# Patient Record
Sex: MALE | Race: WHITE | Employment: STUDENT | URBAN - METROPOLITAN AREA
[De-identification: names, ages, dates, MRNs, and addresses within clinical notes are randomized per-mention and may not be internally consistent; named-entity substitution may affect disease eponyms.]

---

## 2022-11-27 ENCOUNTER — OFFICE VISIT (OUTPATIENT)
Dept: FAMILY MEDICINE CLINIC | Facility: CLINIC | Age: 22
End: 2022-11-27
Payer: COMMERCIAL

## 2022-11-27 VITALS
HEART RATE: 81 BPM | DIASTOLIC BLOOD PRESSURE: 70 MMHG | WEIGHT: 150 LBS | OXYGEN SATURATION: 99 % | TEMPERATURE: 102 F | BODY MASS INDEX: 21.24 KG/M2 | RESPIRATION RATE: 18 BRPM | SYSTOLIC BLOOD PRESSURE: 104 MMHG | HEIGHT: 70.5 IN

## 2022-11-27 DIAGNOSIS — R68.89 FLU-LIKE SYMPTOMS: Primary | ICD-10-CM

## 2022-11-27 DIAGNOSIS — R50.9 FEVER IN ADULT: ICD-10-CM

## 2022-11-27 LAB
CONTROL LINE PRESENT WITH A CLEAR BACKGROUND (YES/NO): YES YES/NO
STREP GRP A CUL-SCR: NEGATIVE

## 2022-11-27 PROCEDURE — 3078F DIAST BP <80 MM HG: CPT | Performed by: NURSE PRACTITIONER

## 2022-11-27 PROCEDURE — 87637 SARSCOV2&INF A&B&RSV AMP PRB: CPT | Performed by: NURSE PRACTITIONER

## 2022-11-27 PROCEDURE — 3074F SYST BP LT 130 MM HG: CPT | Performed by: NURSE PRACTITIONER

## 2022-11-27 PROCEDURE — 99202 OFFICE O/P NEW SF 15 MIN: CPT | Performed by: NURSE PRACTITIONER

## 2022-11-27 PROCEDURE — 3008F BODY MASS INDEX DOCD: CPT | Performed by: NURSE PRACTITIONER

## 2022-11-27 PROCEDURE — 87880 STREP A ASSAY W/OPTIC: CPT | Performed by: NURSE PRACTITIONER

## 2022-11-27 RX ORDER — BALOXAVIR MARBOXIL 40 MG/1
40 TABLET, FILM COATED ORAL ONCE
Qty: 1 EACH | Refills: 0 | Status: SHIPPED | OUTPATIENT
Start: 2022-11-27 | End: 2022-11-27

## 2022-11-27 NOTE — PATIENT INSTRUCTIONS
Nadeen Siddiqui as prescribed  -Covid/RSV/FLU sent to lab  -Follow CDC guidelines  -Push fluids and plenty of rest    -OTC cough medicine such as Mucinex DM or Robitussin DM (guaifenesin and dextromethorphan) as packet insert for dry and congested cough.     -OTC Tylenol/Ibuprofen as packet insert If no allergies  -Soothing cough drops as packet insert   -Good handwashing, to prevent spread of virus    -Face mask helps prevent viral infections    Follow up in 2-3 days for worsening symptoms with clinic or PCP

## 2022-11-28 LAB
FLUAV + FLUBV RNA SPEC NAA+PROBE: NOT DETECTED
FLUAV + FLUBV RNA SPEC NAA+PROBE: NOT DETECTED
RSV RNA SPEC NAA+PROBE: NOT DETECTED
SARS-COV-2 RNA RESP QL NAA+PROBE: NOT DETECTED

## 2022-11-29 ENCOUNTER — OFFICE VISIT (OUTPATIENT)
Dept: FAMILY MEDICINE CLINIC | Facility: CLINIC | Age: 22
End: 2022-11-29
Payer: COMMERCIAL

## 2022-11-29 VITALS
HEIGHT: 70 IN | TEMPERATURE: 99 F | BODY MASS INDEX: 21.47 KG/M2 | DIASTOLIC BLOOD PRESSURE: 64 MMHG | OXYGEN SATURATION: 98 % | RESPIRATION RATE: 18 BRPM | SYSTOLIC BLOOD PRESSURE: 110 MMHG | WEIGHT: 150 LBS | HEART RATE: 73 BPM

## 2022-11-29 DIAGNOSIS — J03.90 TONSILLITIS: Primary | ICD-10-CM

## 2022-11-29 DIAGNOSIS — R05.9 COUGH IN ADULT PATIENT: ICD-10-CM

## 2022-11-29 LAB
CONTROL LINE PRESENT WITH A CLEAR BACKGROUND (YES/NO): YES YES/NO
STREP GRP A CUL-SCR: NEGATIVE

## 2022-11-29 PROCEDURE — 87880 STREP A ASSAY W/OPTIC: CPT | Performed by: NURSE PRACTITIONER

## 2022-11-29 PROCEDURE — 3078F DIAST BP <80 MM HG: CPT | Performed by: NURSE PRACTITIONER

## 2022-11-29 PROCEDURE — 99213 OFFICE O/P EST LOW 20 MIN: CPT | Performed by: NURSE PRACTITIONER

## 2022-11-29 PROCEDURE — 3074F SYST BP LT 130 MM HG: CPT | Performed by: NURSE PRACTITIONER

## 2022-11-29 PROCEDURE — 3008F BODY MASS INDEX DOCD: CPT | Performed by: NURSE PRACTITIONER

## 2022-11-29 RX ORDER — PREDNISONE 20 MG/1
40 TABLET ORAL DAILY
Qty: 10 TABLET | Refills: 0 | Status: SHIPPED | OUTPATIENT
Start: 2022-11-29 | End: 2022-12-04

## 2022-11-29 RX ORDER — BENZONATATE 200 MG/1
200 CAPSULE ORAL 3 TIMES DAILY PRN
Qty: 30 CAPSULE | Refills: 0 | Status: SHIPPED | OUTPATIENT
Start: 2022-11-29

## 2025-05-01 ENCOUNTER — OFFICE VISIT (OUTPATIENT)
Age: 25
End: 2025-05-01
Attending: GENETIC COUNSELOR, MS
Payer: COMMERCIAL

## 2025-05-01 ENCOUNTER — NURSE ONLY (OUTPATIENT)
Age: 25
End: 2025-05-01
Attending: GENETIC COUNSELOR, MS
Payer: COMMERCIAL

## 2025-05-01 DIAGNOSIS — Z84.81 FAMILY HISTORY OF GENETIC DISEASE CARRIER: Primary | ICD-10-CM

## 2025-05-01 DIAGNOSIS — Z80.42 FAMILY HISTORY OF MALIGNANT NEOPLASM OF PROSTATE: ICD-10-CM

## 2025-05-01 NOTE — PROGRESS NOTES
Reason for visit: Mr. Delgado is a 25-year-old gentleman who was referred for genetic counseling due to his family history of a known mutation within the LZTR1 gene and his family history of cancer.  He was seen today for genetic counseling and a discussion about genetic testing due to this family history.  He is single and was seen in consultation with his father, Lalit, for today's session.   Relevant family history:  Mr. Delgado shares that a number of family members have pursued genetic testing due to a known LZTR1 mutation.  Most recently, his father and his father's identical twin underwent genetic testing through [facility] which yielded a pathogenic mutation in the LZTR1 gene.  He was able to provide documentation of the mutation ([id number]).  His father and his father's twin both had multi-gene analysis with no other findings of clinical significance aside from a VUS in FH.  Family members who have tested positive include his father, his father's twin, and another paternal uncle who also has an NF-1 mutation (which his father did not inherit.)   Malignancies on his paternal side include an uncle who was diagnosed with testicular cancer at age 53 (negative for LZTR1) and a paternal uncle diagnosed with lymphoma at age 53 and his paternal grandmother, who passed away from pancreatic cancer (she was LZTR1+).   On his maternal side, the only malignancy he is aware of is his maternal grandfather, who was diagnosed with prostate cancer at 90.  He is of Citizen of [place] heritage on his paternal side of his family and of [place] heritage on his maternal side of his family and he denies any consanguinity or Ashkenazi Sikh heritage.   Medical history: Mr. Delgado reports that he is overall in good health.  He has not yet had a colonoscopy.  He denies any current dermatological concerns, thyroid issues or prostate issues.   Other medical history of note: Mr. Delgado has not fathered any children to date.  He denies any tobacco use  but admits to consumption of three alcoholic beverages weekly.    Summary:   We discussed hereditary cancer with Mr. Delgado because of his family history.  Most cancer is not hereditary; however, hereditary cancer is suspected under certain conditions, such as when breast cancer occurs prior to the age of 50 (or premenopausal), when there are multiple affected family members, when breast cancer occurs in combination with other cancers, especially ovarian cancer, and when cancer appears to be passing from generation to generation.  We also reviewed the LZTR1 and that this is a gene associated with schwannomatosis and that even if an individual does test positive for a mutation within this gene, there is incomplete penetrance and they may never be diagnosed with a schwannoma.    We discussed dominant inheritance, the pattern in which most hereditary cancer conditions are inherited.  If an individual has such a cancer predisposing gene mutation, there is a 50% chance that any offspring will not inherit the mutation and a 50% chance that he or she will inherit it.  Inheriting the mutation does not automatically mean that one will develop cancer, rather that there is an increased chance for developing certain types of cancer.  Cancer predisposing gene mutations can exist in males and females and can be passed on to both male and female offspring.  Given the information about his father with a known mutation in the LZTR1 gene, this gives Mr. Delgado a 50% chance of having inherited this same mutation.  The pros and cons of cancer predisposing gene mutation testing were reviewed with Mr. Delgado.  Genetic test results can have significant impact on medical management, planning, screening, surgical decision-making, cancer risk assessment for the patient and relatives, and psychological/emotional well-being.      We discussed the benefits and limitations of specific cancer type testing versus multi-gene panels that include multiple  genes.  Panels are an appropriate option for individuals whose history is suggestive of more than one syndrome, and they improve detection rate for identifying the underlying cause of a hereditary cancer.  Limitations of panels include an unknown percentage of variants of unknown significance, as well as an uncertainty of level of risk associated with certain unknown-penetrance genes, and therefore lack of clear guidelines for risk management of carriers of some of these mutations.  There are panels that assess for mutations in only “high risk” and clinically actionable cancer genes as well as larger panels that assess for mutations in high, moderate and unknown-penetrance cancer genes.  Genetic testing could yield one of three results: no mutation detected, deleterious mutation detected, or variant of uncertain significance.  The implications of these potential results were reviewed with Mr. Delgado and his father.  Given the family history of an LZTR1 mutation, he is considered high-risk to have inherited this mutation although there are not specific criteria that exist for this gene.  His maternal family history is not suggestive of a hereditary cancer predisposition syndrome.  After discussing the multiple testing options, Mr. Delgado decided that he would like to pursue genetic testing the known LZTR1 mutation.  The blood was drawn today and sent to [facility] for site specific timing. There should be no cost involved as this is within the 90 day window of his uncle's result.  Turn-around-time is approximately two to three weeks for the testing.  Our office will call Mr. Delgado as soon as results are received; post-test counseling can be scheduled at that time.  Plan:  Ordered LZTR1 site specific analysis through [facility].  The Genetics office will call Mr. Delgado when results are received.  Post-test counseling can be scheduled at that time.  Recommendations for Mr. Delgdao and family members will depend on above test  results.  Thank you for referring Mr. Delgado for genetic counseling; please do not hesitate to contact our office if you have any questions or concerns, [phone].  Send to:   Time spent managing patient care: 40 minutes

## 2025-05-02 ENCOUNTER — APPOINTMENT (OUTPATIENT)
Age: 25
End: 2025-05-02
Attending: GENETIC COUNSELOR, MS
Payer: COMMERCIAL

## 2025-05-20 ENCOUNTER — TELEPHONE (OUTPATIENT)
Age: 25
End: 2025-05-20

## 2025-05-20 NOTE — TELEPHONE ENCOUNTER
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 He was pleased to hear this information. Released results to him through lab's portal and will mail him a copy as well. He was pleased to hear this and all his questions were answered to the best of my ability.

## 2025-07-14 ENCOUNTER — TELEPHONE (OUTPATIENT)
Age: 25
End: 2025-07-14

## 2025-07-14 NOTE — TELEPHONE ENCOUNTER
Spoke with Ti and his father about a bill they are receiving for his genetic testing. Explained genetic counseling services are billed but the genetic testing is free of charge. He will send me the bill and I can better explain why he is receiving this.